# Patient Record
Sex: MALE | HISPANIC OR LATINO | URBAN - METROPOLITAN AREA
[De-identification: names, ages, dates, MRNs, and addresses within clinical notes are randomized per-mention and may not be internally consistent; named-entity substitution may affect disease eponyms.]

---

## 2023-08-21 ENCOUNTER — OFFICE VISIT (OUTPATIENT)
Dept: UROLOGY | Facility: CLINIC | Age: 38
End: 2023-08-21
Payer: COMMERCIAL

## 2023-08-21 VITALS
HEIGHT: 71 IN | HEART RATE: 87 BPM | WEIGHT: 214 LBS | DIASTOLIC BLOOD PRESSURE: 80 MMHG | SYSTOLIC BLOOD PRESSURE: 110 MMHG | BODY MASS INDEX: 29.96 KG/M2 | OXYGEN SATURATION: 99 %

## 2023-08-21 DIAGNOSIS — N48.81: Primary | ICD-10-CM

## 2023-08-21 PROCEDURE — 99203 OFFICE O/P NEW LOW 30 MIN: CPT | Performed by: UROLOGY

## 2023-08-21 NOTE — PROGRESS NOTES
[address]  [facility]  [address] [place] [address]  [phone]  [email or web address]      NAME: Lupe Esparza  AGE: 34 y.o. 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 Reassured. If it becomes large or red he will let me know and we will reinvestigate. Chief Complaint     Chief Complaint   Patient presents with   • Penis Lump       History of Present Illness   New patient office visit: 42-year-old has 1 month of a penile lesion on the dorsum. No previous episodes of this. No pain. No gross hematuria no voiding problems. The following portions of the patient's history were reviewed and updated as appropriate: allergies, current medications, past family history, past medical history, past social history, past surgical history and problem list.  No past medical history on file. Appendicitis  Past Surgical History:   Procedure Laterality Date   • APPENDECTOMY       Appendectomy  shoulder  Review of Systems   Review of Systems   Genitourinary: Negative. Active Problem List   There is no problem list on file for this patient. Objective   /80 (BP Location: Left arm, Patient Position: Sitting, Cuff Size: Large)   Pulse 87   Ht 5' 11" (1.803 m)   Wt 97.1 kg (214 lb)   SpO2 99%   BMI 29.85 kg/m²     Physical Exam  Vitals reviewed. Constitutional:       Appearance: Normal appearance. He is normal weight. HENT:      Head: Normocephalic and atraumatic. Eyes:      Extraocular Movements: Extraocular movements intact. Pulmonary:      Effort: Pulmonary effort is normal.   Abdominal:      General: There is no distension. Palpations: Abdomen is soft. There is no mass. Tenderness: There is no abdominal tenderness.  There is no right CVA tenderness, left CVA tenderness, guarding or rebound. Hernia: No hernia is present. Genitourinary:     Penis: Normal.       Testes: Normal.      Comments: Has very tiny lesion that appears to be under the skin, not consistent with cancer or condyloma, may be a small thrombosed vein. No redness. Appears completely benign. Musculoskeletal:         General: Normal range of motion. Cervical back: Normal range of motion. Skin:     Coloration: Skin is not jaundiced or pale. Neurological:      General: No focal deficit present. Mental Status: He is alert and oriented to person, place, and time. Psychiatric:         Mood and Affect: Mood normal.         Behavior: Behavior normal.         Thought Content: Thought content normal.         Judgment: Judgment normal.             Current Medications   No current outpatient medications on file.         Eduarda Sim MD